# Patient Record
Sex: FEMALE | Race: WHITE | NOT HISPANIC OR LATINO | URBAN - METROPOLITAN AREA
[De-identification: names, ages, dates, MRNs, and addresses within clinical notes are randomized per-mention and may not be internally consistent; named-entity substitution may affect disease eponyms.]

---

## 2018-10-21 ENCOUNTER — EMERGENCY (EMERGENCY)
Facility: HOSPITAL | Age: 48
LOS: 1 days | Discharge: HOME | End: 2018-10-21
Attending: EMERGENCY MEDICINE | Admitting: EMERGENCY MEDICINE
Payer: COMMERCIAL

## 2018-10-21 VITALS
HEART RATE: 112 BPM | RESPIRATION RATE: 18 BRPM | OXYGEN SATURATION: 94 % | SYSTOLIC BLOOD PRESSURE: 149 MMHG | TEMPERATURE: 98 F | DIASTOLIC BLOOD PRESSURE: 111 MMHG

## 2018-10-21 PROCEDURE — 82962 GLUCOSE BLOOD TEST: CPT

## 2018-10-21 PROCEDURE — 99284 EMERGENCY DEPT VISIT MOD MDM: CPT

## 2018-10-21 PROCEDURE — 99283 EMERGENCY DEPT VISIT LOW MDM: CPT

## 2018-10-21 RX ADMIN — Medication 1 MILLIGRAM(S): at 22:08

## 2018-10-21 NOTE — ED ADULT NURSE NOTE - OBJECTIVE STATEMENT
pt received into  [facility] from home complaining of anxiety has hx on anxiety on Ativan 1mg PRN took this morning but panic attack did not start until 9pm and pt did not take her ativan pt received into  [facility] from home complaining of anxiety has hx on anxiety on Ativan 1mg PRN took this morning but panic attack did not start until 9pm and pt did not take her ativan at that time. Pt denies SI/HI audio/ visual hallucinations no drug use but admits to 3 glasses of wine tonight. Pt provided glass of water for comfort awaiting md venegas will monitor and reassess pt in NAD informed and agreeable to plan

## 2018-10-21 NOTE — ED ADULT NURSE NOTE - NSIMPLEMENTINTERV_GEN_ALL_ED
Implemented All Universal Safety Interventions:  [place] to call system. Call bell, personal items and telephone within reach. Instruct patient to call for assistance. Room bathroom lighting operational. Non-slip footwear when patient is off stretcher. Physically safe environment: no spills, clutter or unnecessary equipment. Stretcher in lowest position, wheels locked, appropriate side rails in place.

## 2018-10-21 NOTE — ED PROVIDER NOTE - OBJECTIVE STATEMENT
47 y/o F w/hx MDD on celexa, anxiety and difficulty sleeping on ativan/trazodone/ambien, several psychiatric admissions in the past and at least 1 prior SA, last admitted to [facility] on 7/2/18 for 10 days, since that time reportedly compliant with medications and doing well, follows with psychiatrist 1x monthly, last seen 2 wks ago, today p/w what pt refers to as a panic attack in which she returned home from dinner and walking around the city with friends for the day and began to feel "closed in" and "out of control." She did not take her evening dose of ativan. Instead, pt called EMS. EMS reports that pt was tearful and in distress upon arrival but mood improved in route to hospital. At time of interview pt states that she feels much better, denying SI/HI/AH/VH. Her next appt with psych is 11/5/18 but can be seen earlier. Admits to drinking 3 glasses of wine at dinner over the course of 2 hrs, an additional 2 hours ago. Doesn't believe she's drunk. No other substance use/abuse. In regard to today's events, states that today was "a really good day," and can't fully explain why she had an attack tonight.

## 2018-10-25 DIAGNOSIS — F41.9 ANXIETY DISORDER, UNSPECIFIED: ICD-10-CM

## 2018-10-25 DIAGNOSIS — R41.82 ALTERED MENTAL STATUS, UNSPECIFIED: ICD-10-CM

## 2021-03-08 ENCOUNTER — EMERGENCY (EMERGENCY)
Facility: HOSPITAL | Age: 51
LOS: 1 days | Discharge: HOME | End: 2021-03-08
Attending: EMERGENCY MEDICINE | Admitting: EMERGENCY MEDICINE
Payer: COMMERCIAL

## 2021-03-08 VITALS
HEIGHT: 64 IN | WEIGHT: 169.98 LBS | TEMPERATURE: 99 F | SYSTOLIC BLOOD PRESSURE: 151 MMHG | OXYGEN SATURATION: 100 % | DIASTOLIC BLOOD PRESSURE: 105 MMHG | HEART RATE: 106 BPM | RESPIRATION RATE: 18 BRPM

## 2021-03-08 VITALS — HEART RATE: 94 BPM | DIASTOLIC BLOOD PRESSURE: 98 MMHG | SYSTOLIC BLOOD PRESSURE: 146 MMHG

## 2021-03-08 DIAGNOSIS — I10 ESSENTIAL (PRIMARY) HYPERTENSION: ICD-10-CM

## 2021-03-08 PROBLEM — F41.9 ANXIETY DISORDER, UNSPECIFIED: Chronic | Status: ACTIVE | Noted: 2018-10-21

## 2021-03-08 PROCEDURE — 99284 EMERGENCY DEPT VISIT MOD MDM: CPT

## 2021-03-08 PROCEDURE — 99283 EMERGENCY DEPT VISIT LOW MDM: CPT

## 2021-03-08 RX ORDER — AMLODIPINE BESYLATE 2.5 MG/1
1 TABLET ORAL
Qty: 7 | Refills: 0
Start: 2021-03-08 | End: 2021-03-14

## 2021-03-08 RX ORDER — AMLODIPINE BESYLATE 2.5 MG/1
5 TABLET ORAL ONCE
Refills: 0 | Status: COMPLETED | OUTPATIENT
Start: 2021-03-08 | End: 2021-03-08

## 2021-03-08 RX ADMIN — Medication 1 MILLIGRAM(S): at 12:44

## 2021-03-08 RX ADMIN — AMLODIPINE BESYLATE 5 MILLIGRAM(S): 2.5 TABLET ORAL at 14:07

## 2021-03-08 NOTE — ED PROVIDER NOTE - CHPI ED SYMPTOMS NEG
no unsteadiness on feet, no numbness or tingling, no changes in vision/no chest pain/no cough/no dizziness/no shortness of breath

## 2021-03-08 NOTE — ED PROVIDER NOTE - NSFOLLOWUPINSTRUCTIONS_ED_ALL_ED_FT
please take medication as prescribed    please follow up with your primary care doctor this week           HYPERTENSION - AfterCare(R) Instructions(ER/ED)           Hypertension    WHAT YOU NEED TO KNOW:    Hypertension is high blood pressure. Your blood pressure is the force of your blood moving against the walls of your arteries. Hypertension causes your blood pressure to get so high that your heart has to work much harder than normal. This can damage your heart. The cause of hypertension may not be known. This is called essential or primary hypertension. Hypertension caused by another medical condition, such as kidney disease, is called secondary hypertension.    DISCHARGE INSTRUCTIONS:    Call your local emergency number (911 in the ) or have someone call if:   •You have chest pain.      •You have any of the following signs of a heart attack: ?Squeezing, pressure, or pain in your chest      ?You may also have any of the following: ?Discomfort or pain in your back, neck, jaw, stomach, or arm      ?Shortness of breath      ?Nausea or vomiting      ?Lightheadedness or a sudden cold sweat        •You become confused or have trouble speaking.      •You suddenly feel lightheaded or have trouble breathing.      Return to the emergency department if:   •You have a severe headache or vision loss.      •You have weakness in an arm or leg.      Call your doctor if:   •You feel faint, dizzy, confused, or drowsy.      •You have been taking your blood pressure medicine but your pressure is higher than your provider says it should be.      •You have questions or concerns about your condition or care.      Medicines: You may need any of the following:  •Antihypertensives may be used to help lower your blood pressure. Several kinds of medicines are available. Your healthcare provider will choose medicines based on the kind of hypertension you have. You may need more than one type of medicine. Take the medicine exactly as directed.      •Diuretics help decrease extra fluid that collects in your body. This will help lower your blood pressure. You may urinate more often while you take this medicine.      •Cholesterol medicine helps lower your cholesterol level. A low cholesterol level helps prevent heart disease and makes it easier to control your blood pressure.      •Take your medicine as directed. Contact your healthcare provider if you think your medicine is not helping or if you have side effects. Tell him or her if you are allergic to any medicine. Keep a list of the medicines, vitamins, and herbs you take. Include the amounts, and when and why you take them. Bring the list or the pill bottles to follow-up visits. Carry your medicine list with you in case of an emergency.      Follow up with your doctor as directed: You will need to return to have your blood pressure checked and to have other lab tests done. Write down your questions so you remember to ask them during your visits.    Stages of hypertension:     Blood Pressure Readings     •Normal blood pressure is 119/79 or lower. Your healthcare provider may only check your blood pressure each year if it stays at a normal level.      •Elevated blood pressure is 120/79 to 129/79. This is sometimes called prehypertension. Your healthcare provider may suggest lifestyle changes to help lower your blood pressure to a normal level. He or she may then check it again in 3 to 6 months.      •Stage 1 hypertension is 130/80 to 139/89. Your provider may recommend lifestyle changes, medication, and checks every 3 to 6 months until your blood pressure is controlled.      •Stage 2 hypertension is 140/90 or higher. Your provider will recommend lifestyle changes and have you take 2 kinds of hypertension medicines. You will also need to have your blood pressure checked monthly until it is controlled.      Manage hypertension:   •Check your blood pressure at home. Avoid smoking, caffeine, and exercise at least 30 minutes before checking your blood pressure. Sit and rest for 5 minutes before you take your blood pressure. Extend your arm and support it on a flat surface. Your arm should be at the same level as your heart. Follow the directions that came with your blood pressure monitor. Check your blood pressure 2 times, 1 minute apart, before you take your medicine in the morning. Also check your blood pressure before your evening meal. Keep a record of your readings and bring it to your follow-up visits. Ask your healthcare provider what your blood pressure should be.  How to take a Blood Pressure           •Manage any other health conditions you have. Health conditions such as diabetes can increase your risk for hypertension. Follow your healthcare provider's instructions and take all your medicines as directed.      •Ask about all medicines. Certain medicines can increase your blood pressure. Examples include oral birth control pills, decongestants, herbal supplements, and NSAIDs, such as ibuprofen. Your healthcare provider can tell you which medicines are safe for you to take. This includes prescription and over-the-counter medicines.      Lifestyle changes you can make to manage hypertension: Your healthcare provider may recommend you work with a team to manage hypertension. The team may include medical experts such as a dietitian, an exercise or physical therapist, and a behavior therapist. Your family members may be included in helping you create lifestyle changes.  •Limit sodium (salt) as directed. Too much sodium can affect your fluid balance. Check labels to find low-sodium or no-salt-added foods. Some low-sodium foods use potassium salts for flavor. Too much potassium can also cause health problems. Your healthcare provider will tell you how much sodium and potassium are safe for you to have in a day. He or she may recommend that you limit sodium to 2,300 mg a day.             •Follow the meal plan recommended by your healthcare provider. A dietitian or your provider can give you more information on low-sodium plans or the DASH (Dietary Approaches to Stop Hypertension) eating plan. The DASH plan is low in sodium, processed sugar, unhealthy fats, and total fat. It is high in potassium, calcium, and fiber. These can be found in vegetables, fruit, and whole-grain foods.             •Be physically active throughout the day. Physical activity, such as exercise, can help control your blood pressure and your weight. Be physically active for at least 30 minutes per day, on most days of the week. Include aerobic activity, such as walking or riding a bicycle. Also include strength training at least 2 times each week. Your healthcare providers can help you create a physical activity plan.   Family Walking for Exercise       Strength Training for Adults           •Decrease stress. This may help lower your blood pressure. Learn ways to relax, such as deep breathing or listening to music.      •Limit alcohol as directed. Alcohol can increase your blood pressure. A drink of alcohol is 12 ounces of beer, 5 ounces of wine, or 1½ ounces of liquor.      •Do not smoke. Nicotine and other chemicals in cigarettes and cigars can increase your blood pressure and also cause lung damage. Ask your healthcare provider for information if you currently smoke and need help to quit. E-cigarettes or smokeless tobacco still contain nicotine. Talk to your healthcare provider before you use these products.  Prevent Heart Disease               © Copyright [employer] 2021           back to top                          © Copyright [employer] 2021

## 2021-03-08 NOTE — ED ADULT TRIAGE NOTE - CHIEF COMPLAINT QUOTE
Pt was at GYN office for regular check up, reports SBP of 180s in office and was sent to ED. Pt denies headache, dizziness, n/v, chest pain, sob, weakness. Pt ambulating with steady gait.

## 2021-03-08 NOTE — ED PROVIDER NOTE - ATTENDING CONTRIBUTION TO CARE
49 y/o F PMHx anxiety, presents to ED with htn. Pt was nervous when visiting her gyn. She endorses that she typically has elevated bp under stressful  conditions and she was stressed as it was her first day back at work and had not taken her home dose of ativan 1mg this morning. BP noted 180's/100-110's at gyn office. GYN consulted with pcp and recommended Pt be evaluated in the ED. Pt denies headache, dizziness, lightheadedness, chest pain, cough, sob, numbness, tingling, and weakness. States that she feels at baseline. Initial BP measured during ambulatory triage was 151/105. Plan to repeat vitals, administer ativan, and reevaluate. plan to start pt on norvasc 5mg and follow up with pmd this week. ED evaluation and management discussed with the patient and family (if available) in detail.  Close PMD follow up encouraged.  Strict ED return instructions discussed in detail and patient given the opportunity to ask any questions about their discharge diagnosis and instructions. Patient verbalized understanding. Patient is agreeable to plan.    Agree with above  Pt with elevated bp but asymptomatic  Not first time with elevation as per pt  Plan on norvasc 5mg and follow up with PMD this week for further recommendations

## 2021-03-08 NOTE — ED ADULT NURSE NOTE - OTHER COMPLAINTS
Pt reports LMP "last October" and "there's no chance I'm pregnant." Pt tested positive for covid 2/25 and never had symptoms, was released to go back to work today.

## 2021-03-08 NOTE — ED ADULT NURSE NOTE - NSIMPLEMENTINTERV_GEN_ALL_ED
Implemented All Universal Safety Interventions:  [place] to call system. Call bell, personal items and telephone within reach. Instruct patient to call for assistance. Room bathroom lighting operational. Non-slip footwear when patient is off stretcher. Physically safe environment: no spills, clutter or unnecessary equipment. Stretcher in lowest position, wheels locked, appropriate side rails in place.

## 2021-03-08 NOTE — ED PROVIDER NOTE - OBJECTIVE STATEMENT
49 y/o F PMHx anxiety (on Ativan 1mg) presents with asymptomatic htn. Pt was at the gyn office where her BP was noted to be 180's/100-110's after it was checked 3 times. Her gyn (Dr. Lewis) was concerned and sent her to the ED after consulting her pcp. Pt denies headache, cough, chest pain, sob, dizziness, changes in vision, numbness and tingling, and unsteadiness on her feet. She endorses that she typically has elevated BP when visiting medical offices. States that she previously had high BP when she did not take her anxiety medication. States that she had a stressful day at work as it was her first day back after quarantining due to COVID-19 infection.

## 2021-03-08 NOTE — ED PROVIDER NOTE - CLINICAL SUMMARY MEDICAL DECISION MAKING FREE TEXT BOX
Pleasant 51 y/o F PMHx anxiety, presents to ED with htn. Pt was nervous when visiting her gyn. She endorses that she typically has elevated bp under stressful  conditions and she was stressed as it was her first day back at work and had not taken her home dose of ativan 1mg this morning. BP noted 180's/100-110's at gyn office. GYN consulted with pcp and recommended Pt be evaluated in the ED. Pt denies headache, dizziness, lightheadedness, chest pain, cough, sob, numbness, tingling, and weakness. States that she feels at baseline. Initial BP measured during ambulatory triage was 151/105. Plan to repeat vitals, administer ativan, and reevaluate. Pleasant 51 y/o F PMHx anxiety, presents to ED with htn. Pt was nervous when visiting her gyn. She endorses that she typically has elevated bp under stressful  conditions and she was stressed as it was her first day back at work and had not taken her home dose of ativan 1mg this morning. BP noted 180's/100-110's at gyn office. GYN consulted with pcp and recommended Pt be evaluated in the ED. Pt denies headache, dizziness, lightheadedness, chest pain, cough, sob, numbness, tingling, and weakness. States that she feels at baseline. Initial BP measured during ambulatory triage was 151/105. Plan to repeat vitals, administer ativan, and reevaluate. plan to start pt on norvasc 5mg and follow up with pmd this week. ED evaluation and management discussed with the patient and family (if available) in detail.  Close PMD follow up encouraged.  Strict ED return instructions discussed in detail and patient given the opportunity to ask any questions about their discharge diagnosis and instructions. Patient verbalized understanding. Patient is agreeable to plan.

## 2021-03-08 NOTE — ED PROVIDER NOTE - PATIENT PORTAL LINK FT
You can access the FollowMyHealth Patient Portal offered by [facility] by registering at the following website: [email or web address]. By joining [employer]’s FollowMyHealth portal, you will also be able to view your health information using other applications (apps) compatible with our system.

## 2021-03-08 NOTE — ED ADULT TRIAGE NOTE - OTHER COMPLAINTS
Pt reports LMP "last October." Pt tested positive for covid 2/25 and never had symptoms, was released to go back to work today. Pt reports LMP "last October" and "there's no chance I'm pregnant." Pt tested positive for covid 2/25 and never had symptoms, was released to go back to work today.

## 2021-03-08 NOTE — ED PROVIDER NOTE - PHYSICAL EXAMINATION
General: Patient is well developed and well nourished. Patient is alert and oriented to person, place and date. Patient is sitting stretcher and appears in no acute distress.  HEENT: Head is normocephalic and atraumatic. Pupils are equal, round and reactive. Extraocular movements intact. No evidence of nystagmus, conjunctival injection, or scleral icterus. External ears symmetric without evidence of discharge.  Nose is symmetric, non-tender, patent without evidence of discharge. Teeth in good repair. Uvula midline.   Neck: Supple with no evidence of lymphadenopathy.  Full range of motion.  Heart: Regular rate and rhythm. No murmurs, rubs or gallops.   Lungs: Clear to auscultation bilaterally with equal chest expansion. No note of wheezes, rhonchi, rales. Equal chest expansion. No note of retractions.  Abdomen: Bowel sounds present in all four quadrants. Soft, non-tender, non-distended without signs of masses, rebound or guarding. No note of hepatosplenomegaly. No CVA tenderness bilaterally. Negative Diaz sign or McBurney's.  :   	MALE: no evidence or rashes, ulcers, nodules or scars. No evidence of discharge, scrotal masses or hernias  	Female: external genitalia without rashes, erythema, edema or ulcers. Vaginal mucosa pink and moist with clear vaginal discharge. No note of atrophy, erythema, ulcers, lesions, inflammation, abnormal discharge, nodules or masses in vaginal vault. Cervix is (punctate/stellate) without evidence of petichiae. Ovaries are non-palpable on physical exam.   Musculoskeletal: No edema, erythema, ecchymosis, atrophy or deformity. F No clubbing or cyanosis. No point tenderness to palpation.   Neuro: Cranial nerves II-XII intact. GCS 15. Moving all extremities. Strength is 5/5 arms and legs bilaterally. Sensation intact in extremities. gait steady   Skin: Warm, dry and intact without evidence of rashes, bruising, pallor, jaundice or cyanosis.   Psych: Mood and affect appropriate. General: Patient is well developed and well nourished. Patient is alert and oriented to person, place and date. Patient is sitting stretcher and appears in no acute distress.  HEENT: Head is normocephalic and atraumatic. Pupils are equal, round and reactive. Extraocular movements intact. No evidence of nystagmus, conjunctival injection, or scleral icterus. External ears symmetric without evidence of discharge.  Nose is symmetric, non-tender, patent without evidence of discharge. Teeth in good repair. Uvula midline.   Neck: Supple with no evidence of lymphadenopathy.  Full range of motion.  Heart: Regular rate and rhythm. No murmurs, rubs or gallops.   Lungs: Clear to auscultation bilaterally with equal chest expansion. No note of wheezes, rhonchi, rales. Equal chest expansion. No note of retractions.  Neuro: Cranial nerves II-XII intact. GCS 15. Moving all extremities. Strength is 5/5 arms and legs bilaterally. Sensation intact in extremities. gait steady   Skin: Warm, dry and intact without evidence of rashes, bruising, pallor, jaundice or cyanosis.   Psych: Mood and affect appropriate.

## 2022-06-27 NOTE — ED ADULT NURSE NOTE - NSELOPED_ED_ALL_ED
DATE OF SURGERY: 6/27/2022    PREOPERATIVE DIAGNOSIS: Right arm mass    POSTOPERATIVE DIAGNOSIS: Right arm mass    PROCEDURE: Wide resection right upper arm mass involving the deep fascia, 3.5 x 2.5 cm    SURGEON: Brent Yu MD     ASSISTANT: None    PATIENT HISTORY: Patient is a history of right arm mass.  Her needle biopsy was inconclusive.  She presents now to have this excised understand the risks of bleed infection pain numbness tingling.    DESCRIPTION OF PROCEDURE: The patient underwent successful induction of anesthesia.  The right arm was washed and sterilely prepped and draped.  I made an incision directly over the mass sharply through the skin and divided the superficial subcutaneous tissue with cautery.  I then undermined the skin flaps leaving some fat on the palpable mass.  I dissected down to the fascia.  I determined that there was a small patch of fascia that was slightly adherent to the pseudocapsule of the tumor so this was excised.  This was the fascia of the biceps muscle.  I then irrigated.  The wound was hemostatic.  I closed with 2-0 Vicryl and subcutaneous tissue Monocryl and skin Steri-Strips and sterile dry dressing.  The specimen was marked with a short stitch superficially and a long stitch anteriorly.  It was located on the medial right upper arm.  There were no complications.  Blood loss was less than 5 mL.  The specimen was sent to pathology in formalin.    Brent Yu MD  
Patient and/or family announced that they are leaving. They were advised to stay, advised to return if worse./Physician notified
